# Patient Record
Sex: FEMALE | Race: WHITE | NOT HISPANIC OR LATINO | Employment: PART TIME | URBAN - METROPOLITAN AREA
[De-identification: names, ages, dates, MRNs, and addresses within clinical notes are randomized per-mention and may not be internally consistent; named-entity substitution may affect disease eponyms.]

---

## 2017-02-03 ENCOUNTER — CONVERSION ENCOUNTER (OUTPATIENT)
Dept: OTHER | Facility: HOSPITAL | Age: 62
End: 2017-02-03

## 2018-03-02 ENCOUNTER — OFFICE VISIT CONVERTED (OUTPATIENT)
Dept: FAMILY MEDICINE CLINIC | Age: 63
End: 2018-03-02
Attending: NURSE PRACTITIONER

## 2018-07-06 ENCOUNTER — CONVERSION ENCOUNTER (OUTPATIENT)
Dept: OTHER | Facility: HOSPITAL | Age: 63
End: 2018-07-06

## 2019-01-09 ENCOUNTER — OFFICE VISIT CONVERTED (OUTPATIENT)
Dept: FAMILY MEDICINE CLINIC | Age: 64
End: 2019-01-09
Attending: NURSE PRACTITIONER

## 2019-01-09 ENCOUNTER — HOSPITAL ENCOUNTER (OUTPATIENT)
Dept: OTHER | Facility: HOSPITAL | Age: 64
Discharge: HOME | End: 2019-01-09
Attending: NURSE PRACTITIONER

## 2019-01-11 LAB — BACTERIA SPEC AEROBE CULT: NORMAL

## 2019-03-08 ENCOUNTER — OFFICE VISIT CONVERTED (OUTPATIENT)
Dept: FAMILY MEDICINE CLINIC | Age: 64
End: 2019-03-08
Attending: NURSE PRACTITIONER

## 2019-07-11 ENCOUNTER — HOSPITAL ENCOUNTER (OUTPATIENT)
Dept: OTHER | Facility: HOSPITAL | Age: 64
Discharge: HOME | End: 2019-07-11
Attending: SPECIALIST

## 2020-08-21 ENCOUNTER — HOSPITAL ENCOUNTER (OUTPATIENT)
Dept: OTHER | Facility: HOSPITAL | Age: 65
Discharge: HOME | End: 2020-08-21
Attending: SPECIALIST

## 2021-05-18 NOTE — PROGRESS NOTES
Paige LimaHanny 1955     Office/Outpatient Visit    Visit Date: Fri, Mar 8, 2019 01:47 pm    Provider: Sarah Bain N.P. (Assistant: Thelma Lovleace MA)    Location: [facility]        Electronically signed by Sarah Bain N.P. on  03/11/2019 01:25:58 PM                             SUBJECTIVE:        CC:     Shana is a 63 year old White female.  She presents with sore throat, cough, headache, blood in sinus, fever.          HPI:         Shana presents with uRI.  These have been present for the past 2 weeks.  The symptoms include chest congestion, cough, subjective fever and nasal discharge.  She denies body aches, ear complaints or headache.  She reports recent exposure to illness from clients.  She has already tried to relieve the symptoms with mucinex.  Medical history is significant for allergies.      ROS:     CONSTITUTIONAL:  Positive for fatigue and fever ( low grade ).   Negative for chills.      E/N/T:  Positive for nasal congestion and sore throat.   Negative for ear pain.      CARDIOVASCULAR:  Negative for chest pain and pedal edema.      RESPIRATORY:  Positive for recent cough.   Negative for dyspnea.      NEUROLOGICAL:  Positive for headaches.   Negative for dizziness, fainting or paresthesias.      ALLERGIC/IMMUNOLOGIC:  Positive for seasonal allergies.          PMH/FMH/SH:     Last Reviewed on 3/03/2018 09:19 PM by Sarah Bain    Past Medical History:                 PAST MEDICAL HISTORY         anemia, unspecified         PREVENTIVE HEALTH MAINTENANCE             BONE DENSITY: was last done 2017 which was abnormal     COLORECTAL CANCER SCREENING: follow up in 10 years     DENTAL CLEANING: was last done 2018 - Nae     EYE EXAM: was last done Vivienne  2017     MAMMOGRAM: Done within last 2 years and results in are chart cystic breast - US negative     PAP SMEAR: was last done 2017 with normal results Dr. Eagle         PAST MEDICAL HISTORY              CURRENT MEDICAL PROVIDERS:    Allergist: Dr. Patrick         Surgical History:         : X 1;     Tonsillectomy         Family History:         Positive for Breast Cancer ( mat. aunt ).          Social History:     Occupation: [employer]     Marital Status:      Children: 1 child     Pat denies any current form of exercise.          Tobacco/Alcohol/Supplements:     Last Reviewed on 3/08/2019 01:49 PM by Thelma Lovelace    Tobacco: She has never smoked.          Alcohol: Frequency: Daily Socially When she drinks, the average quantity of alcohol is 4 ounces.   She typically consumes wine.      Caffeine:  She admits to consuming caffeine via coffee ( 2 servings per day ).          Substance Abuse History:     Last Reviewed on 3/03/2018 09:19 PM by Sarah Bain    NEGATIVE         Mental Health History:     Last Reviewed on 3/03/2018 09:19 PM by Sarah Bain    NEGATIVE         Communicable Diseases (eg STDs):     Last Reviewed on 3/03/2018 09:19 PM by Sarah Bain    Reportable health conditions; NEGATIVE             Current Problems:     Last Reviewed on 3/08/2019 02:03 PM by Sarah Bain    Allergic rhinitis     GERD     Strep pharyngitis     URI         Immunizations:     Fluzone Quadrivalent (3+ years) 2019         Allergies:     Last Reviewed on 3/08/2019 01:49 PM by Thelma Lovelace    Penicillins:    Avapro:        Current Medications:     Last Reviewed on 3/08/2019 01:50 PM by Thelma Lovelace    Metformin HCl 500mg Tablet 1 tab daily     Calcium 600 Tablet 2 qam     Singulair  10mg Tablet 1 tab daily HS     Biotin 1000 mcg daily     Colon Health daily     Krill oil 350mg daily     Magnesium 250mg at night     Melatonin     Vitamin B12 1,000mcg Tablet 1 tab daily     Albuterol HFA 90mcg/1actuation Oral Inhaler 1-2 puffs every 4 hours as needed     allergy shot q 2 weeks     Vitamin D3 2,000IU Capsules 1 capsule daily     Zegerid 40mg/1,100mg Capsules Take 1 capsule(s)  by mouth daily     Zyrtec 10mg Tablet Take 1 tab(s) by mouth qd     Aspirin (ASA) 81mg Chewable Tablet     Multivitamin/Mineral Supplement     Vitamin E 400IU Capsules 1 tab daily         OBJECTIVE:        Vitals:         Current: 3/8/2019 1:52:10 PM    Ht:  5 ft, 2.25 in;  Wt: 176.8 lbs;  BMI: 32.1    T: 98.6 F (oral);  BP: 149/88 mm Hg (left arm, sitting);  P: 84 bpm (left arm (BP Cuff), sitting)        Exams:     PHYSICAL EXAM:     GENERAL: vital signs recorded - well developed, well nourished;  no apparent distress, appears moderately ill;     E/N/T: EARS: external auditory canal normal;  bilateral TMs are normal;  NOSE:  normal nasal mucosa, septum, turbinates, and sinuses; OROPHARYNX: posterior pharynx shows 2+ tonsils, erythema, erythematous tonsils, erythematous uvula, and ulcerations on bilateral tonsils;     NECK: range of motion is normal;     RESPIRATORY: normal appearance and symmetric expansion of chest wall; normal respiratory rate and pattern with no distress; normal breath sounds with no rales, rhonchi, wheezes or rubs;     CARDIOVASCULAR: normal rate; rhythm is regular;  no edema;     LYMPHATIC: no enlargement of cervical or facial nodes; no supraclavicular nodes;     MUSCULOSKELETAL: normal gait; normal range of motion of all major muscle groups; no limb or joint pain with range of motion;     NEUROLOGIC: mental status: alert and oriented x 3;     PSYCHIATRIC: appropriate affect and demeanor; normal speech pattern; normal thought and perception;         Lab/Test Results:             Rapid Strep Screen:  Negative (01/09/2019),  Positive (03/08/2019),     Performed by::  cr (03/08/2019),     Influenza A and B:  Negative (03/08/2019),             ASSESSMENT           465.8   J00  URI              DDx:     034.0   J02.0  Strep pharyngitis              DDx:         ORDERS:         Meds Prescribed:       Azithromycin 250mg Tablet 2 po today, 1 po x 4 days  #1 (One) tablet(s) Refills: 0         Lab  Orders:       47068-07  Infectious agent antigen detection by immunoassay; Influenza  (In-House)         49231  Group A Streptococcus detection by immunoassay with direct optical observation  (In-House)         31896  Infectious agent antigen detection by immunoassay; Influenza  (In-House)                   PLAN:          URI           Orders:       [id number]  Infectious agent antigen detection by immunoassay; Influenza  (In-House)         63152  Group A Streptococcus detection by immunoassay with direct optical observation  (In-House)         44764  Infectious agent antigen detection by immunoassay; Influenza  (In-House)            Strep pharyngitis warm salt gargles, tylenol / ibuprofen for fever and pain           Prescriptions:       Azithromycin 250mg Tablet 2 po today, 1 po x 4 days  #1 (One) tablet(s) Refills: 0             CHARGE CAPTURE           **Please note: ICD descriptions below are intended for billing purposes only and may not represent clinical diagnoses**        Primary Diagnosis:         465.8 URI            J00    Acute nasopharyngitis [common cold]              Orders:          [id number]   Office/outpatient visit; established patient, level 3  (In-House)             88710 -[id number]  Infectious agent antigen detection by immunoassay; Influenza  (In-House)             42977   Group A Streptococcus detection by immunoassay with direct optical observation  (In-House)             26002   Infectious agent antigen detection by immunoassay; Influenza  (In-House)           034.0 Strep pharyngitis            J02.0    Streptococcal pharyngitis

## 2021-05-18 NOTE — PROGRESS NOTES
Clarence Paige A. 1955     Preventive Medicine Services    Visit Date: Fri, Mar 2, 2018 01:03 pm    Provider: Sarah Bain N.P. (Assistant: Thelma Lovelace MA)    Location: [facility]        Electronically signed by Sarah Bain N.P. on  03/03/2018 09:25:39 PM                             SUBJECTIVE:        CC:     Shana is a 62 year old White female.  follow up;         HPI:         Shana is here for routine periodic health screening and examination.  Her last physical exam was 1 year ago.  She is status-post hysterectomy.  She is not currently using any form of contraception.  She does not perform breast self-exams.  No history of abnormal Pap smears is reported.          Concerning allergic rhinitis, this has been a problem for the past several years.  The pattern of symptoms is described as perennial, with virtually year-round symptoms.  Her primary symptoms include none , currently doing well.  She has already tried an antihistamine and allergen immunotherapy.  Medical history is pertinent for allergies and allergic rhinitis.      ROS:     CONSTITUTIONAL:  Negative for chills, fatigue, fever, and weight change.      CARDIOVASCULAR:  Negative for chest pain, orthopnea, paroxysmal nocturnal dyspnea and pedal edema.      RESPIRATORY:  Positive for recent cough and wheezing ( controlled with medication most nights ).   Negative for dyspnea.      GASTROINTESTINAL:  Negative for abdominal pain, constipation, diarrhea, nausea and vomiting.      INTEGUMENTARY/BREAST:  Positive for skin tags  - (posterior neck).      NEUROLOGICAL:  Negative for headaches.      ENDOCRINE:  Positive for hot flashes.   Negative for excessive sweating.      ALLERGIC/IMMUNOLOGIC:  Positive for perennial allergies.      PSYCHIATRIC:  Negative for anxiety, depression, and sleep disturbances.          PMH/FMH/SH:     Last Reviewed on 3/03/2018 09:19 PM by Sarah Bain    Past Medical History:                 PAST  MEDICAL HISTORY         anemia, unspecified         PREVENTIVE HEALTH MAINTENANCE             BONE DENSITY: was last done 2017 which was abnormal     COLORECTAL CANCER SCREENING: follow up in 10 years     DENTAL CLEANING: was last done 2018 - Nae     EYE EXAM: was last done Vivienne  2017     MAMMOGRAM: Done within last 2 years and results in are chart cystic breast - US negative     PAP SMEAR: was last done 2017 with normal results Dr. Eagle         PAST MEDICAL HISTORY             CURRENT MEDICAL PROVIDERS:    Allergist: Dr. Patrick         Surgical History:         : X 1;     Tonsillectomy         Family History:         Positive for Breast Cancer ( mat. aunt ).          Social History:     Occupation:      Marital Status:      Children: 1 child     Pat denies any current form of exercise.          Tobacco/Alcohol/Supplements:     Last Reviewed on 3/03/2018 09:19 PM by Sarah Bain    Tobacco: She has never smoked.          Alcohol: Frequency: Daily Socially When she drinks, the average quantity of alcohol is 4 ounces.   She typically consumes wine.      Caffeine:  She admits to consuming caffeine via coffee ( 2 servings per day ).          Substance Abuse History:     Last Reviewed on 3/03/2018 09:19 PM by Sarah Bain    NEGATIVE         Mental Health History:     Last Reviewed on 3/03/2018 09:19 PM by Sarah Bain    NEGATIVE         Communicable Diseases (eg STDs):     Last Reviewed on 3/03/2018 09:19 PM by Sarah Bain    Reportable health conditions; NEGATIVE             Current Problems:     Last Reviewed on 3/03/2018 09:19 PM by Sarah Bain    Allergic rhinitis     GERD         Immunizations:     None        Allergies:     Last Reviewed on 3/03/2018 09:19 PM by Sarah Bain    Penicillins:    Avapro:        Current Medications:     Last Reviewed on 3/03/2018 09:19 PM by Sarah Bain    Biotin 1000 mcg daily     Colon Health  daily     Krill oil 350mg daily     Magnesium 250mg at night     Melatonin     Vitamin B12 1,000mcg Tablet 1 tab daily     Albuterol HFA 90mcg/1actuation Oral Inhaler 1-2 puffs every 4 hours as needed     allergy shots q 2 weeks     Vitamin D3 2,000IU Capsules 1 capsule daily     Zegerid 40mg/1,100mg Capsules Take 1 capsule(s) by mouth daily     Zyrtec 10mg Tablet Take 1 tab(s) by mouth qd     Aspirin (ASA) 81mg Chewable Tablet     Multivitamin/Mineral Supplement     Vitamin E 400IU Capsules 1 tab daily     Calcium 600 Tablet 2 qam     Singulair  10mg Tablet 1 tab daily HS         OBJECTIVE:        Vitals:         Current: 3/2/2018 1:06:21 PM    Ht:  5 ft, 2.25 in;  Wt: 183 lbs;  BMI: 33.2    T: 98.1 F (oral);  BP: 140/85 mm Hg (right arm, sitting);  P: 79 bpm (right arm (BP Cuff), sitting)        Exams:     PHYSICAL EXAM:     GENERAL: vital signs recorded - well developed, well nourished;  no apparent distress;     NECK: range of motion is normal; thyroid is non-palpable;     RESPIRATORY: normal respiratory rate and pattern with no distress; normal breath sounds with no rales, rhonchi, wheezes or rubs;     CARDIOVASCULAR: normal rate; rhythm is regular;  no systolic murmur; no edema;     GASTROINTESTINAL: nontender; normal bowel sounds; no organomegaly;     BREAST/INTEGUMENT: skin tags to posterior neck - no concerning appearance;     MUSCULOSKELETAL: normal gait; normal range of motion of all major muscle groups; no limb or joint pain with range of motion;     NEUROLOGICAL:  cranial nerves, motor and sensory function, reflexes, gait and coordination are all intact;     PSYCHIATRIC:  appropriate affect and demeanor; normal speech pattern; grossly normal memory;         ASSESSMENT:           V70.0   Z00.00  Health checkup              DDx:     477.9   J30.9  Allergic rhinitis              DDx:         PLAN:          Health checkup         RECOMMENDATIONS given include: need for yearly flu shots and annual check up,  we will obtain copy of recent colonoscopy and mammogram for chart, continue paps per GYN recommendations, annual lab testing.           Allergic rhinitis         RECOMMENDATIONS given include: continue follow up with Dr. Arnold, Continue injections and oral medication as prescribed.      FOLLOW-UP: Keep currently scheduled appointment..              Patient Recommendations:        For  Health checkup:     Obtain a flu shot each year. I also recommend annual check up, we will obtain copy of recent colonoscopy and mammogram for chart, continue paps per GYN recommendations, annual lab testing.          For  Allergic rhinitis:     I also recommend continue follow up with Dr. Arnold, Continue injections and oral medication as prescribed.                  APPOINTMENT INFORMATION:        Monday Tuesday Wednesday Thursday Friday Saturday Sunday            Time:___________________AM  PM   Date:_____________________             CHARGE CAPTURE:           Primary Diagnosis:     V70.0 Health checkup            Z00.00    Encounter for general adult medical examination without abnormal findings              Orders:          10738   Preventive medicine, established patient, age 40-64 years  (In-House)           477.9 Allergic rhinitis            J30.9    Allergic rhinitis, unspecified

## 2021-05-18 NOTE — PROGRESS NOTES
Clarence Paige A. 1955     Office/Outpatient Visit    Visit Date:  10:37 am    Provider: Sarah Bain N.P. (Assistant: Sayra Elizabeth LPN)    Location: [facility]        Electronically signed by Sarah Bain N.P. on  2019 11:14:01 AM                             SUBJECTIVE:        CC:     Shana is a 63 year old White female.  She presents with cold symptoms.          HPI:         Pat presents with acute upper respiratory infection.  These have been present for the past one to two days.  The symptoms include body aches, chest congestion, cough, ear complaints, fever and nausea.  She has already tried to relieve the symptoms with alkaseltzer cold and flu;  mucinex DM.      ROS:     CONSTITUTIONAL:  Positive for fever.      CARDIOVASCULAR:  Negative for chest pain, orthopnea, paroxysmal nocturnal dyspnea and pedal edema.      RESPIRATORY:  Positive for recent cough and pleuritic chest pain.   Negative for dyspnea or frequent wheezing.      GASTROINTESTINAL:  Negative for abdominal pain, constipation, diarrhea, nausea and vomiting.      MUSCULOSKELETAL:  Positive for myalgias (body aches).      NEUROLOGICAL:  Positive for headaches.          PMH/FMH/SH:     Last Reviewed on 3/03/2018 09:19 PM by Sarah Bain    Past Medical History:                 PAST MEDICAL HISTORY         anemia, unspecified         PREVENTIVE HEALTH MAINTENANCE             BONE DENSITY: was last done  which was abnormal     COLORECTAL CANCER SCREENING: follow up in 10 years     DENTAL CLEANING: was last done 2018 - Nae     EYE EXAM: was last done Vivienne  2017     MAMMOGRAM: Done within last 2 years and results in are chart cystic breast - US negative     PAP SMEAR: was last done  with normal results Dr. Eagle         PAST MEDICAL HISTORY             CURRENT MEDICAL PROVIDERS:    Allergist: Dr. Patrick         Surgical History:         : X 1;      Tonsillectomy         Family History:         Positive for Breast Cancer ( mat. aunt ).          Social History:     Occupation: [employer]     Marital Status:      Children: 1 child     Pat denies any current form of exercise.          Tobacco/Alcohol/Supplements:     Last Reviewed on 1/09/2019 10:41 AM by Sayra Elizabeth    Tobacco: She has never smoked.          Alcohol: Frequency: Daily Socially When she drinks, the average quantity of alcohol is 4 ounces.   She typically consumes wine.      Caffeine:  She admits to consuming caffeine via coffee ( 2 servings per day ).          Substance Abuse History:     Last Reviewed on 3/03/2018 09:19 PM by Sarah Bain    NEGATIVE         Mental Health History:     Last Reviewed on 3/03/2018 09:19 PM by Sarah Bain    NEGATIVE         Communicable Diseases (eg STDs):     Last Reviewed on 3/03/2018 09:19 PM by Sarah Bain    Reportable health conditions; NEGATIVE             Current Problems:     Last Reviewed on 3/03/2018 09:19 PM by Sarah Bain    Allergic rhinitis     GERD     Influenza due to identified novel influenza A virus with other manifestations         Immunizations:     None        Allergies:     Last Reviewed on 1/09/2019 10:39 AM by Sayra Elizabeth    Penicillins:    Avapro:        Current Medications:     Last Reviewed on 1/09/2019 10:39 AM by Sayra Elizabeth    Calcium 600 Tablet 2 qam     Singulair  10mg Tablet 1 tab daily HS     Biotin 1000 mcg daily     Colon Health daily     Krill oil 350mg daily     Magnesium 250mg at night     Melatonin     Vitamin B12 1,000mcg Tablet 1 tab daily     Albuterol HFA 90mcg/1actuation Oral Inhaler 1-2 puffs every 4 hours as needed     allergy shot q 2 weeks     Vitamin D3 2,000IU Capsules 1 capsule daily     Zegerid 40mg/1,100mg Capsules Take 1 capsule(s) by mouth daily     Zyrtec 10mg Tablet Take 1 tab(s) by mouth qd     Aspirin (ASA) 81mg Chewable Tablet     Multivitamin/Mineral  Supplement     Vitamin E 400IU Capsules 1 tab daily     Metformin HCl 500mg Tablet 1 tab daily         OBJECTIVE:        Vitals:         Current: 1/9/2019 10:39:28 AM    Ht:  5 ft, 2.25 in;  Wt: 173.4 lbs;  BMI: 31.5    T: 99.2 F (oral);  BP: 125/71 mm Hg (left arm, sitting);  P: 97 bpm (left arm (BP Cuff), sitting)        Exams:     PHYSICAL EXAM:     GENERAL: vital signs recorded - well developed, well nourished;  no apparent distress, appears moderately ill;     E/N/T: EARS: external auditory canal edematous bilaterally and erythematous;  OROPHARYNX:  normal mucosa, dentition, gingiva, and posterior pharynx;     NECK: range of motion is normal; thyroid is non-palpable;     RESPIRATORY: normal respiratory rate and pattern with no distress; normal breath sounds with no rales, rhonchi, wheezes or rubs;     CARDIOVASCULAR: normal rate; rhythm is regular;  no systolic murmur; no edema;     GASTROINTESTINAL: nontender; normal bowel sounds; no organomegaly;     LYMPHATIC: no enlargement of cervical or facial nodes; no supraclavicular nodes;     MUSCULOSKELETAL: normal gait; normal range of motion of all major muscle groups; no limb or joint pain with range of motion;     NEUROLOGICAL:  cranial nerves, motor and sensory function, reflexes, gait and coordination are all intact;     PSYCHIATRIC:  appropriate affect and demeanor; normal speech pattern; grossly normal memory;         Lab/Test Results:             Rapid Strep Screen:  Negative (01/09/2019),     Performed by::  tls (01/09/2019),     Influenza A:  Positive (01/09/2019),     Influenza B:  Negative (01/09/2019),             ASSESSMENT:           488.89   J09.X9  Influenza due to identified novel influenza A virus with other manifestations              DDx:         ORDERS:         Meds Prescribed:       Tamiflu (Oseltamivir) 75mg Capsules Take 1 capsule(s) by mouth bid for 5 days  #10 (Ten) capsule(s) Refills: 0         Lab Orders:       [id number]  Infectious agent  antigen detection by immunoassay; Influenza  (In-House)         [id number]  Group A Streptococcus detection by immunoassay with direct optical observation  (In-House)         09565  Infectious agent antigen detection by immunoassay; Influenza  (In-House)         24020  [facility] Throat culture, strep  (Send-Out)                   PLAN:          Influenza due to identified novel influenza A virus with other manifestations         RECOMMENDATIONS given include: Push Fluids, Rest, Follow up if no improvement or worsening symptoms like high fevers, vomiting, weakness, or increasing shortness of air.    , rest, and OTC mucinex/robitussin  - follow up if worsening respiratory sympotms or if no improvement  - good hand hygiene, cover cough.  School/Work Excuse for Yesterday Today           Prescriptions:       Tamiflu (Oseltamivir) 75mg Capsules Take 1 capsule(s) by mouth bid for 5 days  #10 (Ten) capsule(s) Refills: 0           Orders:       [id number]  Infectious agent antigen detection by immunoassay; Influenza  (In-House)         73722  Group A Streptococcus detection by immunoassay with direct optical observation  (In-House)         23869  Infectious agent antigen detection by immunoassay; Influenza  (In-House)         09078  [facility] Throat culture, strep  (Send-Out)             Patient Education Handouts:       Influenza              Patient Recommendations:        For  Influenza due to identified novel influenza A virus with other manifestations:     Get plenty of rest. I also recommend OTC mucinex/robitussin  - follow up if worsening respiratory sympotms or if no improvement  - good hand hygiene, cover cough.              CHARGE CAPTURE:           Primary Diagnosis:     488.89 Influenza due to identified novel influenza A virus with other manifestations            J09.X9    Influenza due to identified novel influenza A virus with other manifestations              Orders:          81417   Office/outpatient visit;  established patient, level 3  (In-House)             92578   Infectious agent antigen detection by immunoassay; Influenza  (In-House)             37636   Group A Streptococcus detection by immunoassay with direct optical observation  (In-House)             79038   Infectious agent antigen detection by immunoassay; Influenza  (In-House)

## 2021-07-01 VITALS
TEMPERATURE: 99.2 F | WEIGHT: 173.4 LBS | DIASTOLIC BLOOD PRESSURE: 71 MMHG | SYSTOLIC BLOOD PRESSURE: 125 MMHG | HEART RATE: 97 BPM | BODY MASS INDEX: 31.91 KG/M2 | HEIGHT: 62 IN

## 2021-07-01 VITALS
HEIGHT: 62 IN | SYSTOLIC BLOOD PRESSURE: 149 MMHG | TEMPERATURE: 98.6 F | DIASTOLIC BLOOD PRESSURE: 88 MMHG | HEART RATE: 84 BPM | BODY MASS INDEX: 32.54 KG/M2 | WEIGHT: 176.8 LBS

## 2021-07-01 VITALS
TEMPERATURE: 98.1 F | SYSTOLIC BLOOD PRESSURE: 140 MMHG | HEIGHT: 62 IN | BODY MASS INDEX: 33.68 KG/M2 | WEIGHT: 183 LBS | DIASTOLIC BLOOD PRESSURE: 85 MMHG | HEART RATE: 79 BPM

## 2021-08-19 ENCOUNTER — TRANSCRIBE ORDERS (OUTPATIENT)
Dept: ADMINISTRATIVE | Facility: HOSPITAL | Age: 66
End: 2021-08-19

## 2021-08-19 DIAGNOSIS — Z12.31 VISIT FOR SCREENING MAMMOGRAM: Primary | ICD-10-CM

## 2021-09-16 ENCOUNTER — HOSPITAL ENCOUNTER (OUTPATIENT)
Dept: MAMMOGRAPHY | Facility: HOSPITAL | Age: 66
Discharge: HOME | End: 2021-09-16
Admitting: SPECIALIST

## 2021-09-16 DIAGNOSIS — Z12.31 VISIT FOR SCREENING MAMMOGRAM: ICD-10-CM

## 2021-09-16 PROCEDURE — 77067 SCR MAMMO BI INCL CAD: CPT

## 2021-09-16 PROCEDURE — 77063 BREAST TOMOSYNTHESIS BI: CPT

## 2021-09-30 ENCOUNTER — CLINICAL SUPPORT (OUTPATIENT)
Dept: FAMILY MEDICINE CLINIC | Age: 66
End: 2021-09-30

## 2021-09-30 DIAGNOSIS — Z23 NEED FOR INFLUENZA VACCINATION: Primary | ICD-10-CM

## 2021-09-30 PROCEDURE — G0008 ADMIN INFLUENZA VIRUS VAC: HCPCS | Performed by: FAMILY MEDICINE

## 2021-09-30 PROCEDURE — 90662 IIV NO PRSV INCREASED AG IM: CPT | Performed by: FAMILY MEDICINE

## 2022-08-26 ENCOUNTER — TRANSCRIBE ORDERS (OUTPATIENT)
Dept: ADMINISTRATIVE | Facility: HOSPITAL | Age: 67
End: 2022-08-26

## 2022-08-26 DIAGNOSIS — Z12.31 SCREENING MAMMOGRAM FOR HIGH-RISK PATIENT: Primary | ICD-10-CM

## 2022-10-06 ENCOUNTER — HOSPITAL ENCOUNTER (OUTPATIENT)
Dept: MAMMOGRAPHY | Facility: HOSPITAL | Age: 67
Discharge: HOME | End: 2022-10-06
Admitting: SPECIALIST

## 2022-10-06 DIAGNOSIS — Z12.31 SCREENING MAMMOGRAM FOR HIGH-RISK PATIENT: ICD-10-CM

## 2022-10-06 PROCEDURE — 77063 BREAST TOMOSYNTHESIS BI: CPT

## 2022-10-06 PROCEDURE — 77067 SCR MAMMO BI INCL CAD: CPT

## 2025-06-19 ENCOUNTER — OFFICE VISIT (OUTPATIENT)
Dept: FAMILY MEDICINE CLINIC | Age: 70
End: 2025-06-19
Payer: MEDICARE

## 2025-06-19 VITALS
DIASTOLIC BLOOD PRESSURE: 80 MMHG | HEIGHT: 62 IN | HEART RATE: 67 BPM | OXYGEN SATURATION: 98 % | SYSTOLIC BLOOD PRESSURE: 160 MMHG | TEMPERATURE: 98.1 F | BODY MASS INDEX: 29.4 KG/M2 | WEIGHT: 159.8 LBS

## 2025-06-19 DIAGNOSIS — Z12.31 SCREENING MAMMOGRAM FOR BREAST CANCER: ICD-10-CM

## 2025-06-19 DIAGNOSIS — Z78.0 POSTMENOPAUSAL STATE: ICD-10-CM

## 2025-06-19 DIAGNOSIS — Z13.1 SCREENING FOR DIABETES MELLITUS: ICD-10-CM

## 2025-06-19 DIAGNOSIS — Z12.11 SCREENING FOR COLON CANCER: ICD-10-CM

## 2025-06-19 DIAGNOSIS — R03.0 ELEVATED BLOOD PRESSURE READING WITHOUT DIAGNOSIS OF HYPERTENSION: Primary | ICD-10-CM

## 2025-06-19 DIAGNOSIS — E07.9 DISORDER OF THYROID, UNSPECIFIED: ICD-10-CM

## 2025-06-19 DIAGNOSIS — Z11.59 NEED FOR HEPATITIS C SCREENING TEST: ICD-10-CM

## 2025-06-19 DIAGNOSIS — Z23 ENCOUNTER FOR IMMUNIZATION: ICD-10-CM

## 2025-06-19 DIAGNOSIS — Z13.220 SCREENING, LIPID: ICD-10-CM

## 2025-06-19 NOTE — PROGRESS NOTES
Please note that portions of this document were completed using a voice recognition program.     Patient or patient representative verbalized consent for the use of Ambient Listening during the visit with  NETTA Hdez for chart documentation. 6/19/2025  10:53 EDT    Chief Complaint  Follow-up (New patient, general check up)    Inocencio Lima presents to [facility] today for       History of Present Illness  The patient is a 70-year-old female who presents to [facility] care with a new primary care provider.    She has previously consulted with [facility] and Dr. Jones, but not in recent years. She has sought medical attention at the clinic on several occasions due to COVID-19 and sinus infections. She reports overall good health, with no chronic conditions such as hypertension or diabetes. She is currently on Medicare and takes numerous supplements. She has not undergone any laboratory tests in recent years. She has received all recommended COVID-19 vaccines, but not the one for the 2025 season. She has also received the influenza vaccine. She has had shingles and is considering the shingles vaccine. She has not received the pneumonia vaccine. She has never participated in a Medicare wellness visit.    She is under the care of Dr. 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 The cause of this condition remains undetermined. She is unsure if it is retinopathy. She has been receiving injections every few months for approximately a year, with the understanding that discontinuation could lead to further vision deterioration. She is currently using contact lenses.    She has been monitoring her blood pressure at home, which has shown some improvement. Her dys blood pressure typically ranges in the 70s, while her top readings are borderline. She does not  "recall her most recent blood pressure reading.    She underwent a bone density test a few years ago, which revealed bone thinning, prompting a recommendation for a daily calcium intake of 2500 mg. She had a colonoscopy performed by Dr. Chan over a decade ago and has since completed a Cologuard test, which was negative.    SOCIAL HISTORY  She works at [employer].        No current outpatient medications on file.  There are no discontinued medications.      Allergies:  Patient has no known allergies.      Objective   Vital Signs:   Vitals:    06/19/25 1045 06/19/25 1108   BP: (!) 148/101 160/80   BP Location: Right arm Right arm   Patient Position: Sitting    Cuff Size: Adult    Pulse: 67    Temp: 98.1 °F (36.7 °C)    TempSrc: Oral    SpO2: 98%    Weight: 72.5 kg (159 lb 12.8 oz)    Height: 158.1 cm (62.24\")      Body mass index is 29 kg/m².          Physical Exam  Constitutional:       Appearance: Normal appearance.   Neck:      Vascular: No carotid bruit.   Cardiovascular:      Rate and Rhythm: Normal rate and regular rhythm.      Heart sounds: Normal heart sounds.   Pulmonary:      Effort: Pulmonary effort is normal.      Breath sounds: Normal breath sounds.   Musculoskeletal:         General: Normal range of motion.   Skin:     General: Skin is warm and dry.   Neurological:      General: No focal deficit present.      Mental Status: She is alert.   Psychiatric:         Mood and Affect: Mood normal.         Behavior: Behavior normal.                             Procedures         Diagnoses and all orders for this visit:    1. Elevated blood pressure reading without diagnosis of hypertension (Primary)  -     Comprehensive Metabolic Panel; Future  -     CBC & Differential; Future    2. Postmenopausal state  -     DEXA Bone Density Axial; Future    3. Disorder of thyroid, unspecified  -     TSH Rfx On Abnormal To Free T4; Future    4. Screening mammogram for breast cancer  -     Mammo Screening Digital Tomosynthesis " Bilateral With CAD; Future    5. Need for hepatitis C screening test  -     Hepatitis C Antibody; Future    6. Screening for diabetes mellitus  -     Hemoglobin A1c; Future    7. Screening for colon cancer  Comments:  Needs to schedule after Aug  Orders:  -     Ambulatory Referral For Screening Colonoscopy    8. Screening, lipid  -     Lipid Panel; Future    9. Encounter for immunization  -     Pneumococcal Conjugate Vaccine 20-Valent All          Assessment & Plan  1. Health maintenance.  Her last mammogram was conducted on 10/06/2022. A bone density test and mammogram have been ordered, which she can schedule at her convenience. Routine labs, including CBC, CMP, TSH, hepatitis C screening, A1c, and lipid panel, have been ordered. She has been advised to fast prior to these tests. The shingles vaccine has been recommended, which she can receive at her pharmacy. The pneumonia vaccine will be administered today. A screening colonoscopy has been scheduled for later this year.    2. Right eye abnormality.  She reports receiving eye injections from Dr. Chavez for a condition involving busted blood vessels or veins in the back of her right eye. The exact diagnosis is unclear, but she has been advised to continue the injections to prevent vision deterioration.    3. Blood pressure management.  Her blood pressure readings have been inconsistent, with a recent reading of 160/80. She has been advised to monitor her blood pressure twice daily, ensuring she is at rest for at least 15 minutes prior to each reading. The nurse will contact her in approximately 2 weeks to collect these readings.    Follow-up  The patient is scheduled for a follow-up visit in 4 weeks to review her blood work results and assess her blood pressure.              Follow Up  Return in about 4 weeks (around 7/17/2025) for Medicare Wellness, Recheck.  Patient was given instructions and counseling regarding her condition or for health maintenance advice.  Please see specific information pulled into the AVS if appropriate.           Evelyn Moore, APRN  06/19/2025

## 2025-06-20 ENCOUNTER — PATIENT ROUNDING (BHMG ONLY) (OUTPATIENT)
Dept: FAMILY MEDICINE CLINIC | Age: 70
End: 2025-06-20
Payer: MEDICARE

## 2025-06-20 NOTE — PROGRESS NOTES
I am calling to officially welcome you to our practice and ask about your recent visit. Is this a good time to talk?  YES  Tell me about your visit with us. What things went well?  PROVIDER WAS SUPER NICE!       We're always looking for ways to make our patients' experiences even better. Do you have recommendations on ways we may improve?  NO    Overall were you satisfied with your first visit to our practice? YES       I appreciate you taking the time to speak with me today. Is there anything else I can do for you? NO      Thank you, and have a great day.

## 2025-06-26 ENCOUNTER — LAB (OUTPATIENT)
Dept: LAB | Facility: HOSPITAL | Age: 70
End: 2025-06-26
Payer: MEDICARE

## 2025-06-26 DIAGNOSIS — Z13.220 SCREENING, LIPID: ICD-10-CM

## 2025-06-26 DIAGNOSIS — Z11.59 NEED FOR HEPATITIS C SCREENING TEST: ICD-10-CM

## 2025-06-26 DIAGNOSIS — Z13.1 SCREENING FOR DIABETES MELLITUS: ICD-10-CM

## 2025-06-26 DIAGNOSIS — E07.9 DISORDER OF THYROID, UNSPECIFIED: ICD-10-CM

## 2025-06-26 DIAGNOSIS — R03.0 ELEVATED BLOOD PRESSURE READING WITHOUT DIAGNOSIS OF HYPERTENSION: ICD-10-CM

## 2025-06-26 LAB
ALBUMIN SERPL-MCNC: 3.8 G/DL (ref 3.5–5.2)
ALBUMIN/GLOB SERPL: 1.5 G/DL
ALP SERPL-CCNC: 89 U/L (ref 39–117)
ALT SERPL W P-5'-P-CCNC: 26 U/L (ref 1–33)
ANION GAP SERPL CALCULATED.3IONS-SCNC: 10.3 MMOL/L (ref 5–15)
AST SERPL-CCNC: 32 U/L (ref 1–32)
BASOPHILS # BLD AUTO: 0.02 10*3/MM3 (ref 0–0.2)
BASOPHILS NFR BLD AUTO: 0.5 % (ref 0–1.5)
BILIRUB SERPL-MCNC: 0.5 MG/DL (ref 0–1.2)
BUN SERPL-MCNC: 12 MG/DL (ref 8–23)
BUN/CREAT SERPL: 17.6 (ref 7–25)
CALCIUM SPEC-SCNC: 9.1 MG/DL (ref 8.6–10.5)
CHLORIDE SERPL-SCNC: 107 MMOL/L (ref 98–107)
CHOLEST SERPL-MCNC: 154 MG/DL (ref 0–200)
CO2 SERPL-SCNC: 24.7 MMOL/L (ref 22–29)
CREAT SERPL-MCNC: 0.68 MG/DL (ref 0.57–1)
DEPRECATED RDW RBC AUTO: 51.2 FL (ref 37–54)
EGFRCR SERPLBLD CKD-EPI 2021: 93.8 ML/MIN/1.73
EOSINOPHIL # BLD AUTO: 0.2 10*3/MM3 (ref 0–0.4)
EOSINOPHIL NFR BLD AUTO: 4.9 % (ref 0.3–6.2)
ERYTHROCYTE [DISTWIDTH] IN BLOOD BY AUTOMATED COUNT: 13.9 % (ref 12.3–15.4)
GLOBULIN UR ELPH-MCNC: 2.5 GM/DL
GLUCOSE SERPL-MCNC: 87 MG/DL (ref 65–99)
HBA1C MFR BLD: 5.6 % (ref 4.8–5.6)
HCT VFR BLD AUTO: 43.5 % (ref 34–46.6)
HCV AB SER QL: NORMAL
HDLC SERPL-MCNC: 51 MG/DL (ref 40–60)
HGB BLD-MCNC: 14.5 G/DL (ref 12–15.9)
IMM GRANULOCYTES # BLD AUTO: 0 10*3/MM3 (ref 0–0.05)
IMM GRANULOCYTES NFR BLD AUTO: 0 % (ref 0–0.5)
LDLC SERPL CALC-MCNC: 93 MG/DL (ref 0–100)
LDLC/HDLC SERPL: 1.84 {RATIO}
LYMPHOCYTES # BLD AUTO: 1.64 10*3/MM3 (ref 0.7–3.1)
LYMPHOCYTES NFR BLD AUTO: 40.5 % (ref 19.6–45.3)
MCH RBC QN AUTO: 32.6 PG (ref 26.6–33)
MCHC RBC AUTO-ENTMCNC: 33.3 G/DL (ref 31.5–35.7)
MCV RBC AUTO: 97.8 FL (ref 79–97)
MONOCYTES # BLD AUTO: 0.77 10*3/MM3 (ref 0.1–0.9)
MONOCYTES NFR BLD AUTO: 19 % (ref 5–12)
NEUTROPHILS NFR BLD AUTO: 1.42 10*3/MM3 (ref 1.7–7)
NEUTROPHILS NFR BLD AUTO: 35.1 % (ref 42.7–76)
PLATELET # BLD AUTO: 228 10*3/MM3 (ref 140–450)
PMV BLD AUTO: 9.1 FL (ref 6–12)
POTASSIUM SERPL-SCNC: 4.1 MMOL/L (ref 3.5–5.2)
PROT SERPL-MCNC: 6.3 G/DL (ref 6–8.5)
RBC # BLD AUTO: 4.45 10*6/MM3 (ref 3.77–5.28)
SODIUM SERPL-SCNC: 142 MMOL/L (ref 136–145)
TRIGL SERPL-MCNC: 45 MG/DL (ref 0–150)
TSH SERPL DL<=0.05 MIU/L-ACNC: 1.67 UIU/ML (ref 0.27–4.2)
VLDLC SERPL-MCNC: 10 MG/DL (ref 5–40)
WBC NRBC COR # BLD AUTO: 4.05 10*3/MM3 (ref 3.4–10.8)

## 2025-06-26 PROCEDURE — 84443 ASSAY THYROID STIM HORMONE: CPT

## 2025-06-26 PROCEDURE — 36415 COLL VENOUS BLD VENIPUNCTURE: CPT

## 2025-06-26 PROCEDURE — 86803 HEPATITIS C AB TEST: CPT

## 2025-06-26 PROCEDURE — 80053 COMPREHEN METABOLIC PANEL: CPT

## 2025-06-26 PROCEDURE — 80061 LIPID PANEL: CPT

## 2025-06-26 PROCEDURE — 85025 COMPLETE CBC W/AUTO DIFF WBC: CPT

## 2025-06-26 PROCEDURE — 83036 HEMOGLOBIN GLYCOSYLATED A1C: CPT

## 2025-07-03 ENCOUNTER — TELEPHONE (OUTPATIENT)
Dept: FAMILY MEDICINE CLINIC | Age: 70
End: 2025-07-03
Payer: MEDICARE

## 2025-07-03 DIAGNOSIS — I10 PRIMARY HYPERTENSION: Primary | ICD-10-CM

## 2025-07-03 RX ORDER — HYDROCHLOROTHIAZIDE 12.5 MG/1
12.5 TABLET ORAL DAILY
Qty: 30 TABLET | Refills: 0 | Status: SHIPPED | OUTPATIENT
Start: 2025-07-03

## 2025-07-03 NOTE — TELEPHONE ENCOUNTER
----- Message from Austin Moore sent at 6/19/2025 11:08 AM EDT -----  Call in about 2 weeks and get BP readings .

## 2025-07-24 ENCOUNTER — OFFICE VISIT (OUTPATIENT)
Dept: FAMILY MEDICINE CLINIC | Age: 70
End: 2025-07-24
Payer: MEDICARE

## 2025-07-24 VITALS
BODY MASS INDEX: 29.44 KG/M2 | OXYGEN SATURATION: 96 % | HEART RATE: 67 BPM | TEMPERATURE: 98 F | HEIGHT: 62 IN | SYSTOLIC BLOOD PRESSURE: 150 MMHG | WEIGHT: 160 LBS | DIASTOLIC BLOOD PRESSURE: 90 MMHG

## 2025-07-24 DIAGNOSIS — I10 PRIMARY HYPERTENSION: ICD-10-CM

## 2025-07-24 DIAGNOSIS — Z00.00 MEDICARE ANNUAL WELLNESS VISIT, SUBSEQUENT: Primary | ICD-10-CM

## 2025-07-24 RX ORDER — LISINOPRIL 10 MG/1
10 TABLET ORAL DAILY
Qty: 90 TABLET | Refills: 1 | Status: SHIPPED | OUTPATIENT
Start: 2025-07-24

## 2025-07-24 RX ORDER — HYDROCHLOROTHIAZIDE 12.5 MG/1
12.5 TABLET ORAL DAILY
Qty: 90 TABLET | Refills: 1 | Status: SHIPPED | OUTPATIENT
Start: 2025-07-24

## 2025-07-24 NOTE — PROGRESS NOTES
" Subjective   The ABCs of the Annual Wellness Visit  Medicare Wellness Visit      Paige Lima is a 70 y.o. patient who presents for a Medicare Wellness Visit.    The following portions of the patient's history were reviewed and   updated as appropriate: allergies, current medications, past family history, past medical history, past social history, past surgical history, and problem list.    Compared to one year ago, the patient's physical   health is the same.  Compared to one year ago, the patient's mental   health is the same.    Recent Hospitalizations:  She was not admitted to the hospital during the last year.     Current Medical Providers:  Patient Care Team:  Austin Moore APRN as PCP - General (Nurse Practitioner)  Robert Aleman MD as Consulting Physician (Ophthalmology)    Outpatient Medications Prior to Visit   Medication Sig Dispense Refill    hydroCHLOROthiazide 12.5 MG tablet Take 1 tablet by mouth Daily. 30 tablet 0     No facility-administered medications prior to visit.     No opioid medication identified on active medication list. I have reviewed chart for other potential  high risk medication/s and harmful drug interactions in the elderly.      Aspirin is not on active medication list.  Aspirin use is not indicated based on review of current medical condition/s. Risk of harm outweighs potential benefits.  .    There is no problem list on file for this patient.    Advance Care Planning Advance Directive is not on file.  ACP discussion was held with the patient during this visit. Patient does not have an advance directive, information provided.            Objective   Vitals:    07/24/25 1129 07/24/25 1216   BP: 150/92 150/90   BP Location: Left arm Left arm   Patient Position: Sitting    Cuff Size: Adult    Pulse: 67    Temp: 98 °F (36.7 °C)    TempSrc: Oral    SpO2: 96%    Weight: 72.6 kg (160 lb)    Height: 158.1 cm (62.24\")    PainSc: 4   Comment: off and on    PainLoc: Back  " "      Estimated body mass index is 29.04 kg/m² as calculated from the following:    Height as of this encounter: 158.1 cm (62.24\").    Weight as of this encounter: 72.6 kg (160 lb).    BMI is >= 25 and <30. (Overweight) The following options were offered after discussion;: weight loss educational material (shared in after visit summary)           Does the patient have evidence of cognitive impairment? No  Lab Results   Component Value Date    TRIG 45 2025    HDL 51 2025    LDL 93 2025    VLDL 10 2025    HGBA1C 5.60 2025                                                                                                Health  Risk Assessment    Smoking Status:  Social History     Tobacco Use   Smoking Status Never   Smokeless Tobacco Never     Alcohol Consumption:  Social History     Substance and Sexual Activity   Alcohol Use Not Currently       Fall Risk Screen  STEADI Fall Risk Assessment was completed, and patient is at LOW risk for falls.Assessment completed on:2025    Depression Screening   Little interest or pleasure in doing things? Not at all   Feeling down, depressed, or hopeless? Not at all   PHQ-2 Total Score 0      Health Habits and Functional and Cognitive Screenin/24/2025    11:28 AM   Functional & Cognitive Status   Do you have difficulty preparing food and eating? No   Do you have difficulty bathing yourself, getting dressed or grooming yourself? No   Do you have difficulty using the toilet? No   Do you have difficulty moving around from place to place? No   Do you have trouble with steps or getting out of a bed or a chair? No   Current Diet Well Balanced Diet   Dental Exam Up to date   Eye Exam Up to date   Exercise (times per week) 3 times per week   Current Exercises Include Walking   Do you need help using the phone?  No   Are you deaf or do you have serious difficulty hearing?  No   Do you need help to go to places out of walking distance? No   Do you need " help shopping? No   Do you need help preparing meals?  No   Do you need help with housework?  No   Do you need help with laundry? No   Do you need help taking your medications? No   Do you need help managing money? No   Do you ever drive or ride in a car without wearing a seat belt? No   Have you felt unusual fatigue (could be tiredness), stress, anger or loneliness in the last month? No   Who do you live with? Spouse   If you need help, do you have trouble finding someone available to you? No   Have you been bothered in the last four weeks by sexual problems? No   Do you have difficulty concentrating, remembering or making decisions? No           Age-appropriate Screening Schedule:  Refer to the list below for future screening recommendations based on patient's age, sex and/or medical conditions. Orders for these recommended tests are listed in the plan section. The patient has been provided with a written plan.    Health Maintenance List  Health Maintenance   Topic Date Due    DXA SCAN  Never done    TDAP/TD VACCINES (1 - Tdap) Never done    COLORECTAL CANCER SCREENING  Never done    MAMMOGRAM  10/06/2024    COVID-19 Vaccine (8 - 2024-25 season) 12/19/2025 (Originally 4/17/2025)    ZOSTER VACCINE (2 of 2) 08/22/2025    INFLUENZA VACCINE  10/01/2025    ANNUAL WELLNESS VISIT  07/24/2026    HEPATITIS C SCREENING  Completed    Pneumococcal Vaccine 50+  Completed                                                                                                                                                CMS Preventative Services Quick Reference  Risk Factors Identified During Encounter  Immunizations Discussed/Encouraged: Td    The above risks/problems have been discussed with the patient.  Pertinent information has been shared with the patient in the After Visit Summary.  An After Visit Summary and PPPS were made available to the patient.    Follow Up:   Next Medicare Wellness visit to be scheduled in 1 year.    "      Additional E&M Note during same encounter follows:  Patient has additional, significant, and separately identifiable condition(s)/problem(s) that require work above and beyond the Medicare Wellness Visit     Chief Complaint  Medicare Wellness-Initial Visit ([facility])    Subjective    HPI  Paige is also being seen today for an annual adult preventative physical exam.        The patient is a 70-year-old female who presents for a Medicare wellness exam and follow-up.    She has been on Medicare for over a year and has not had a wellness visit before. She reports no weight loss and has gained a pound. Her physical activity is limited due to the heat, but she manages to ride her bike. She does not take any chronic medications other than hydrochlorothiazide. She does not see any other providers other than her eye doctor. She has not been hospitalized in the last year. She feels her physical health is about the same as last year. She reports no recent changes in vision or hearing, cognitive impairment, chronic pain, depression, drug use, or recent falls. She has not received a tetanus vaccine in the last 10 years. She has received the shingles and pneumonia vaccines. She has a consultation scheduled for a colonoscopy and has bone density and mammogram tests planned.    Her blood pressure readings at home are slightly higher than those recorded here. She is considering increasing her medication dosage.    Social History:  Occupations: Works at [employer] 2 days a week for 40 years          Objective   Vital Signs:  /90 (BP Location: Left arm)   Pulse 67   Temp 98 °F (36.7 °C) (Oral)   Ht 158.1 cm (62.24\")   Wt 72.6 kg (160 lb)   SpO2 96%   BMI 29.04 kg/m²         Physical Exam  Constitutional:       Appearance: Normal appearance.   Neck:      Vascular: No carotid bruit.   Cardiovascular:      Rate and Rhythm: Normal rate and regular rhythm.      Heart sounds: Normal heart sounds.   Pulmonary:      Effort: " Pulmonary effort is normal.      Breath sounds: Normal breath sounds.   Musculoskeletal:         General: Normal range of motion.   Skin:     General: Skin is warm and dry.   Neurological:      General: No focal deficit present.      Mental Status: She is alert.   Psychiatric:         Mood and Affect: Mood normal.         Behavior: Behavior normal.               The following data was reviewed by: NETTA Hdez on 07/24/2025:    Common labs          6/26/2025    08:35   Common Labs   Glucose 87    BUN 12.0    Creatinine 0.68    Sodium 142    Potassium 4.1    Chloride 107    Calcium 9.1    Albumin 3.8    Total Bilirubin 0.5    Alkaline Phosphatase 89    AST (SGOT) 32    ALT (SGPT) 26    WBC 4.05    Hemoglobin 14.5    Hematocrit 43.5    Platelets 228    Total Cholesterol 154    Triglycerides 45    HDL Cholesterol 51    LDL Cholesterol  93    Hemoglobin A1C 5.60      CBC          6/26/2025    08:35   CBC   WBC 4.05    RBC 4.45    Hemoglobin 14.5    Hematocrit 43.5    MCV 97.8    MCH 32.6    MCHC 33.3    RDW 13.9    Platelets 228      CBC w/diff          6/26/2025    08:35   CBC w/Diff   WBC 4.05    RBC 4.45    Hemoglobin 14.5    Hematocrit 43.5    MCV 97.8    MCH 32.6    MCHC 33.3    RDW 13.9    Platelets 228    Neutrophil Rel % 35.1    Immature Granulocyte Rel % 0.0    Lymphocyte Rel % 40.5    Monocyte Rel % 19.0    Eosinophil Rel % 4.9    Basophil Rel % 0.5      Lipid Panel          6/26/2025    08:35   Lipid Panel   Total Cholesterol 154    Triglycerides 45    HDL Cholesterol 51    VLDL Cholesterol 10    LDL Cholesterol  93    LDL/HDL Ratio 1.84      Most Recent A1C          6/26/2025    08:35   HGBA1C Most Recent   Hemoglobin A1C 5.60        Results             Assessment and Plan Additional age appropriate preventative wellness advice topics were discussed during today's preventative wellness exam(some topics already addressed during AWV portion of the note above):   Nutrition: Discussed nutrition  plan with patient. Information shared in after visit summary. Goal is for a well balanced diet to enhance overall health.     Motor Vehicle Safety Discussion:  Wearing Seatbelt While in Motor Vehicle recommendation. Adhering to posted speed limit recommendation.     Diagnosis Plan   1. Medicare annual wellness visit, subsequent        2. Primary hypertension  lisinopril (PRINIVIL,ZESTRIL) 10 MG tablet    hydroCHLOROthiazide 12.5 MG tablet            1. Medicare wellness exam.  - No recent hospital admissions or significant changes in physical or mental health.  - Lab results from 06/2025 were satisfactory, indicating no need for repeat tests at this time.  - A living will and power of  form was provided for completion.  - Advised to receive a tetanus vaccine at the pharmacy.    2. Hypertension.  - Blood pressure readings have shown some improvement but remain elevated, with a goal of achieving readings in the 130s over 70s and 80s.  - Home blood pressure monitoring continues to be advised.  - Prescription for lisinopril 10 mg daily provided, with a 90-day supply sent to pharmacy.  - Informed about potential side effects of lisinopril, including cough, and instructed to report any such symptoms.  - Blood pressure rechecked today due to elevated reading of 150/92.    Follow-up: A follow-up visit is scheduled for 6 months from now.     Nurse will call in around 1 month to get blood pressure readings.    Follow Up   Return in about 6 months (around 1/24/2026).  Patient was given instructions and counseling regarding her condition or for health maintenance advice. Please see specific information pulled into the AVS if appropriate.  Patient or patient representative verbalized consent for the use of Ambient Listening during the visit with  NETTA Hdez for chart documentation. 7/24/2025  12:03 EDT

## 2025-08-22 ENCOUNTER — TELEPHONE (OUTPATIENT)
Dept: FAMILY MEDICINE CLINIC | Age: 70
End: 2025-08-22
Payer: MEDICARE